# Patient Record
(demographics unavailable — no encounter records)

---

## 2024-10-08 NOTE — DISCUSSION/SUMMARY
[FreeTextEntry1] : EKG:atrial flutter( vs PAT), RBBB/LAHB continue eliquis for AF;asa + Toprol fro CAD; praluent for HLD, Losartan for JHPTN- he didn't want me to chk lipids today and states he will getting labs in 2-3 months with PCP feel cp is non cardiac- advised to call if changes abx prophylaxis for s/p TAVR

## 2024-10-08 NOTE — PHYSICAL EXAM
[Well Developed] : well developed [Well Nourished] : well nourished [No Acute Distress] : no acute distress [Normal Conjunctiva] : normal conjunctiva [Normal Venous Pressure] : normal venous pressure [No Carotid Bruit] : no carotid bruit [No Rub] : no rub [Clear Lung Fields] : clear lung fields [Good Air Entry] : good air entry [No Respiratory Distress] : no respiratory distress  [Soft] : abdomen soft [Non Tender] : non-tender [Normal Bowel Sounds] : normal bowel sounds [Normal Gait] : normal gait [No Edema] : no edema [No Cyanosis] : no cyanosis [No Clubbing] : no clubbing [No Rash] : no rash [Moves all extremities] : moves all extremities [Normal Speech] : normal speech [Alert and Oriented] : alert and oriented [de-identified] :  soft 1/6 BULL

## 2024-10-08 NOTE — HISTORY OF PRESENT ILLNESS
[FreeTextEntry1] : feels well- forb quite some time has occasional left sided chest pinch, lasts a few seconds/non exertional- exercises w/o sx. no sob

## 2025-02-10 NOTE — PHYSICAL EXAM
[Well Developed] : well developed [Well Nourished] : well nourished [No Acute Distress] : no acute distress [Normal Conjunctiva] : normal conjunctiva [Normal Venous Pressure] : normal venous pressure [No Carotid Bruit] : no carotid bruit [No Rub] : no rub [Clear Lung Fields] : clear lung fields [Good Air Entry] : good air entry [No Respiratory Distress] : no respiratory distress  [Soft] : abdomen soft [Non Tender] : non-tender [Normal Bowel Sounds] : normal bowel sounds [Normal Gait] : normal gait [No Edema] : no edema [No Cyanosis] : no cyanosis [No Clubbing] : no clubbing [No Rash] : no rash [Moves all extremities] : moves all extremities [Normal Speech] : normal speech [Alert and Oriented] : alert and oriented [de-identified] :  soft 1/6 BULL

## 2025-02-10 NOTE — DISCUSSION/SUMMARY
[FreeTextEntry1] : EKG:AF,RBBB/LAHB reviewed labs LDL not at goal- add Crestor and continue praluent( he says that he tolerated Crestor and did not have any issues with it)- continue asa +_ toprol for CAD;eliquis for AF, losaartn for HPTN; antibiotic prophylaxis for s/p TAVR